# Patient Record
Sex: MALE | Race: WHITE | NOT HISPANIC OR LATINO | Employment: FULL TIME | URBAN - NONMETROPOLITAN AREA
[De-identification: names, ages, dates, MRNs, and addresses within clinical notes are randomized per-mention and may not be internally consistent; named-entity substitution may affect disease eponyms.]

---

## 2021-02-21 ENCOUNTER — HOSPITAL ENCOUNTER (EMERGENCY)
Facility: HOSPITAL | Age: 26
Discharge: HOME | End: 2021-02-21
Attending: EMERGENCY MEDICINE
Payer: COMMERCIAL

## 2021-02-21 VITALS
WEIGHT: 154.5 LBS | OXYGEN SATURATION: 99 % | HEIGHT: 70 IN | RESPIRATION RATE: 18 BRPM | SYSTOLIC BLOOD PRESSURE: 120 MMHG | BODY MASS INDEX: 22.12 KG/M2 | TEMPERATURE: 98 F | HEART RATE: 61 BPM | DIASTOLIC BLOOD PRESSURE: 74 MMHG

## 2021-02-21 DIAGNOSIS — R42 DIZZINESS: ICD-10-CM

## 2021-02-21 DIAGNOSIS — R42 VERTIGO: Primary | ICD-10-CM

## 2021-02-21 LAB
ALBUMIN SERPL BCP-MCNC: 4.7 G/DL (ref 3.5–5.2)
ALP SERPL-CCNC: 83 U/L (ref 55–135)
ALT SERPL W/O P-5'-P-CCNC: 70 U/L (ref 10–44)
ANION GAP SERPL CALC-SCNC: 2 MMOL/L (ref 8–16)
AST SERPL-CCNC: 31 U/L (ref 10–40)
BASOPHILS # BLD AUTO: 0.05 K/UL (ref 0–0.2)
BASOPHILS NFR BLD: 0.8 % (ref 0–1.9)
BILIRUB SERPL-MCNC: 1 MG/DL (ref 0.1–1)
BUN SERPL-MCNC: 10 MG/DL (ref 6–20)
CALCIUM SERPL-MCNC: 9.8 MG/DL (ref 8.7–10.5)
CHLORIDE SERPL-SCNC: 108 MMOL/L (ref 95–110)
CO2 SERPL-SCNC: 31 MMOL/L (ref 23–29)
CREAT SERPL-MCNC: 1 MG/DL (ref 0.5–1.4)
DIFFERENTIAL METHOD: NORMAL
EOSINOPHIL # BLD AUTO: 0.1 K/UL (ref 0–0.5)
EOSINOPHIL NFR BLD: 1.4 % (ref 0–8)
ERYTHROCYTE [DISTWIDTH] IN BLOOD BY AUTOMATED COUNT: 12.3 % (ref 11.5–14.5)
EST. GFR  (AFRICAN AMERICAN): >60 ML/MIN/1.73 M^2
EST. GFR  (NON AFRICAN AMERICAN): >60 ML/MIN/1.73 M^2
GLUCOSE SERPL-MCNC: 97 MG/DL (ref 70–110)
HCT VFR BLD AUTO: 45.6 % (ref 40–54)
HGB BLD-MCNC: 15.8 G/DL (ref 14–18)
IMM GRANULOCYTES # BLD AUTO: 0.02 K/UL (ref 0–0.04)
IMM GRANULOCYTES NFR BLD AUTO: 0.3 % (ref 0–0.5)
LYMPHOCYTES # BLD AUTO: 1.7 K/UL (ref 1–4.8)
LYMPHOCYTES NFR BLD: 27.1 % (ref 18–48)
MCH RBC QN AUTO: 30 PG (ref 27–31)
MCHC RBC AUTO-ENTMCNC: 34.6 G/DL (ref 32–36)
MCV RBC AUTO: 87 FL (ref 82–98)
MONOCYTES # BLD AUTO: 0.5 K/UL (ref 0.3–1)
MONOCYTES NFR BLD: 8.3 % (ref 4–15)
NEUTROPHILS # BLD AUTO: 4 K/UL (ref 1.8–7.7)
NEUTROPHILS NFR BLD: 62.1 % (ref 38–73)
NRBC BLD-RTO: 0 /100 WBC
PLATELET # BLD AUTO: 301 K/UL (ref 150–350)
PMV BLD AUTO: 10.2 FL (ref 9.2–12.9)
POTASSIUM SERPL-SCNC: 4.3 MMOL/L (ref 3.5–5.1)
PROT SERPL-MCNC: 8.4 G/DL (ref 6–8.4)
RBC # BLD AUTO: 5.27 M/UL (ref 4.6–6.2)
SODIUM SERPL-SCNC: 141 MMOL/L (ref 136–145)
WBC # BLD AUTO: 6.38 K/UL (ref 3.9–12.7)

## 2021-02-21 PROCEDURE — 36415 COLL VENOUS BLD VENIPUNCTURE: CPT

## 2021-02-21 PROCEDURE — 93005 ELECTROCARDIOGRAM TRACING: CPT

## 2021-02-21 PROCEDURE — 93010 EKG 12-LEAD: ICD-10-PCS | Mod: ,,, | Performed by: INTERNAL MEDICINE

## 2021-02-21 PROCEDURE — 93010 ELECTROCARDIOGRAM REPORT: CPT | Mod: ,,, | Performed by: INTERNAL MEDICINE

## 2021-02-21 PROCEDURE — 80053 COMPREHEN METABOLIC PANEL: CPT

## 2021-02-21 PROCEDURE — 99285 EMERGENCY DEPT VISIT HI MDM: CPT | Mod: 25

## 2021-02-21 PROCEDURE — 85025 COMPLETE CBC W/AUTO DIFF WBC: CPT

## 2021-02-21 PROCEDURE — 63600175 PHARM REV CODE 636 W HCPCS: Performed by: NURSE PRACTITIONER

## 2021-02-21 PROCEDURE — 25000003 PHARM REV CODE 250: Performed by: NURSE PRACTITIONER

## 2021-02-21 RX ORDER — ONDANSETRON 4 MG/1
4 TABLET, ORALLY DISINTEGRATING ORAL
Status: COMPLETED | OUTPATIENT
Start: 2021-02-21 | End: 2021-02-21

## 2021-02-21 RX ORDER — PREDNISONE 20 MG/1
40 TABLET ORAL
Status: COMPLETED | OUTPATIENT
Start: 2021-02-21 | End: 2021-02-21

## 2021-02-21 RX ORDER — MECLIZINE HYDROCHLORIDE 25 MG/1
50 TABLET ORAL
Status: COMPLETED | OUTPATIENT
Start: 2021-02-21 | End: 2021-02-21

## 2021-02-21 RX ORDER — MECLIZINE HYDROCHLORIDE 25 MG/1
25 TABLET ORAL 3 TIMES DAILY PRN
Qty: 9 TABLET | Refills: 0 | Status: SHIPPED | OUTPATIENT
Start: 2021-02-21 | End: 2021-02-24

## 2021-02-21 RX ORDER — MECLIZINE HYDROCHLORIDE 25 MG/1
25 TABLET ORAL 3 TIMES DAILY PRN
Qty: 9 TABLET | Refills: 0 | Status: SHIPPED | OUTPATIENT
Start: 2021-02-21 | End: 2021-02-21 | Stop reason: SDUPTHER

## 2021-02-21 RX ADMIN — PREDNISONE 40 MG: 20 TABLET ORAL at 01:02

## 2021-02-21 RX ADMIN — ONDANSETRON 4 MG: 4 TABLET, ORALLY DISINTEGRATING ORAL at 11:02

## 2021-02-21 RX ADMIN — MECLIZINE HYDROCHLORIDE 50 MG: 25 TABLET ORAL at 11:02

## 2023-03-12 ENCOUNTER — OFFICE VISIT (OUTPATIENT)
Dept: URGENT CARE | Facility: CLINIC | Age: 28
End: 2023-03-12
Payer: COMMERCIAL

## 2023-03-12 VITALS
HEIGHT: 71 IN | BODY MASS INDEX: 20.3 KG/M2 | TEMPERATURE: 100 F | WEIGHT: 145 LBS | SYSTOLIC BLOOD PRESSURE: 111 MMHG | DIASTOLIC BLOOD PRESSURE: 68 MMHG | RESPIRATION RATE: 16 BRPM | OXYGEN SATURATION: 97 % | HEART RATE: 109 BPM

## 2023-03-12 DIAGNOSIS — U07.1 COVID-19: Primary | ICD-10-CM

## 2023-03-12 DIAGNOSIS — R50.9 FEVER AND CHILLS: ICD-10-CM

## 2023-03-12 DIAGNOSIS — Z11.59 SCREENING FOR VIRAL DISEASE: ICD-10-CM

## 2023-03-12 LAB
CTP QC/QA: YES
SARS-COV-2 AG RESP QL IA.RAPID: POSITIVE

## 2023-03-12 PROCEDURE — 99204 OFFICE O/P NEW MOD 45 MIN: CPT | Mod: S$GLB,,,

## 2023-03-12 PROCEDURE — 99204 PR OFFICE/OUTPT VISIT, NEW, LEVL IV, 45-59 MIN: ICD-10-PCS | Mod: S$GLB,,,

## 2023-03-12 PROCEDURE — 87811 SARS-COV-2 COVID19 W/OPTIC: CPT | Mod: QW,S$GLB,,

## 2023-03-12 PROCEDURE — 87811 SARS CORONAVIRUS 2 ANTIGEN POCT, MANUAL READ: ICD-10-PCS | Mod: QW,S$GLB,,

## 2023-03-12 RX ORDER — CETIRIZINE HYDROCHLORIDE, PSEUDOEPHEDRINE HYDROCHLORIDE 5; 120 MG/1; MG/1
1 TABLET, FILM COATED, EXTENDED RELEASE ORAL 2 TIMES DAILY
Qty: 20 TABLET | Refills: 0 | Status: SHIPPED | OUTPATIENT
Start: 2023-03-12 | End: 2023-03-22

## 2023-03-12 NOTE — PROGRESS NOTES
"Subjective:       Patient ID: Mitul Pascual is a 27 y.o. male.    Vitals:  height is 5' 11" (1.803 m) and weight is 65.8 kg (145 lb). His tympanic temperature is 99.7 °F (37.6 °C). His blood pressure is 111/68 and his pulse is 109. His respiration is 16 and oxygen saturation is 97%.     Chief Complaint: Fever    States he started with fever last night. He is having chills and sweats, runny nose and slight cough and sneezing. Had a faint positive with at home covid test.     Fever   This is a new problem. The current episode started yesterday. The problem occurs constantly. The problem has been gradually worsening. The maximum temperature noted was 102 to 102.9 F. The temperature was taken using an axillary reading. Associated symptoms include coughing, headaches and a sore throat. Pertinent negatives include no diarrhea, nausea, vomiting or wheezing. He has tried NSAIDs (ibuprofen) for the symptoms. The treatment provided mild relief.     Constitution: Positive for chills and fever.   HENT:  Positive for postnasal drip, sinus pressure and sore throat. Negative for trouble swallowing and voice change.    Neck: Negative for painful lymph nodes.   Respiratory:  Positive for cough. Negative for sputum production, shortness of breath, stridor and wheezing.    Gastrointestinal:  Negative for nausea, vomiting and diarrhea.   Neurological:  Positive for headaches.   Hematologic/Lymphatic: Negative for swollen lymph nodes.     Objective:      Physical Exam   Constitutional: He is oriented to person, place, and time. He appears well-developed. He is cooperative.  Non-toxic appearance. He does not appear ill. No distress.   HENT:   Head: Normocephalic and atraumatic.   Ears:   Right Ear: Hearing, tympanic membrane, external ear and ear canal normal. No tenderness. Tympanic membrane is not erythematous and not bulging. Tympanic membrane mobility is normal. No middle ear effusion.   Left Ear: Hearing, tympanic membrane, external " ear and ear canal normal. No tenderness. Tympanic membrane is not erythematous and not bulging. Tympanic membrane mobility is normal.  No middle ear effusion.   Nose: Rhinorrhea and congestion present. No mucosal edema or nasal deformity. No epistaxis. Right sinus exhibits no maxillary sinus tenderness and no frontal sinus tenderness. Left sinus exhibits no maxillary sinus tenderness and no frontal sinus tenderness.   Mouth/Throat: Uvula is midline and mucous membranes are normal. No trismus in the jaw. Normal dentition. No uvula swelling. Posterior oropharyngeal erythema and cobblestoning present. No oropharyngeal exudate, posterior oropharyngeal edema or tonsillar abscesses. No tonsillar exudate.   Eyes: Conjunctivae and lids are normal. No scleral icterus.   Neck: Trachea normal and phonation normal. Neck supple. No edema present. No erythema present. No neck rigidity present.   Cardiovascular: Normal rate, regular rhythm, S1 normal, S2 normal, normal heart sounds and normal pulses.   Pulmonary/Chest: Effort normal and breath sounds normal. No stridor. No respiratory distress. He has no decreased breath sounds. He has no wheezes. He has no rhonchi. He has no rales. He exhibits no tenderness.   Abdominal: Normal appearance.   Musculoskeletal: Normal range of motion.         General: No deformity. Normal range of motion.   Lymphadenopathy:        Head (right side): No submental, no submandibular, no tonsillar, no preauricular, no posterior auricular and no occipital adenopathy present.        Head (left side): No submental, no submandibular, no tonsillar, no preauricular, no posterior auricular and no occipital adenopathy present.     He has no cervical adenopathy.   Neurological: He is alert and oriented to person, place, and time. He exhibits normal muscle tone. Coordination normal.   Skin: Skin is warm, dry, intact, not diaphoretic, not pale and no rash.   Psychiatric: His speech is normal and behavior is  normal. Judgment and thought content normal.   Nursing note and vitals reviewed.      Assessment:       1. COVID-19    2. Screening for viral disease    3. Fever and chills          Plan:         COVID-19  -     cetirizine-pseudoephedrine 5-120 mg Tb12; Take 1 tablet by mouth 2 (two) times a day. for 10 days  Dispense: 20 tablet; Refill: 0    Screening for viral disease  -     SARS Coronavirus 2 Antigen, POCT Manual Read    Fever and chills      Results for orders placed or performed in visit on 03/12/23   SARS Coronavirus 2 Antigen, POCT Manual Read   Result Value Ref Range    SARS Coronavirus 2 Antigen Positive (A) Negative     Acceptable Yes         - Pt instructed that they can take Tylenol, or acetaminophen for their pain. They can take up to 3,000mg per day, or 6 tablets, with a max of two tablets at a time,  as long as they do not have any liver disease.  -Pt instructed may take Motrin (ibuprofen) as needed every 8 hours for inflammation.      Recommend Vitamin C (1000mg daily), Vitamin d3 (125mcg daily), and Zinc (100mg daily) to help combat the Coronavirus.     Robitussin DM 2 teas every 4 hours as needed for cough.  If you  smoke, please stop smoking.        Discussed POSITIVE test results and plan of care with patient.  They voiced full understanding and are in agreement with the current plan of care.  All known questions and concerns addressed at this time.      You must understand that you have received treatment at an Urgent Care Facility only, and that you may be released before all of your medical problems are known or treated.  Urgent Care facilities are not equipped to handle life threatening emergencies.  It is recommended that you seek care at an Emergency Department for further evaluation of worsening or concerning symptoms, or possibly life threatening conditions as discussed.

## 2023-03-12 NOTE — PATIENT INSTRUCTIONS
Please drink plenty of fluids.  Please get plenty of rest.  Please return here or go to the Emergency Department for any concerns or worsening of condition.     You have tested positive for COVID-19 today.  Please note that patients who test positive for COVID-19 are required by the CDC to undergo isolation for 5 days after their symptoms first began, followed by a 5 day period of strict mask wearing.  This isolation starts from the day you first developed symptoms, not the day of your positive test. For example, if your symptoms began on a Monday but tested positive on the following Wednesday, your 5-day isolation begins from that Monday, not the Wednesday you tested positive.  However, if you are asymptomatic (a person who does not have any symptoms) and COVID-19 positive, your 5-day isolation begins on the day you tested positive, regardless of exposure date.  Also, per the CDC guidelines, once your 5 days have passed, and you have not had fever greater than 100.4F in the last 24 hours without taking any fever reducers such as Tylenol (Acetaminophen) or Motrin (Ibuprofen), you may return to your normal activities including social distancing, wearing masks, and frequent handwashing - YOU DO NOT NEED ANOTHER TEST IN ORDER TO END YOUR QUARANTINE.     If you were prescribed a narcotic medication, do not drive or operate heavy equipment or machinery while taking these medications.     If you do not have Hypertension or any history of palpitations, it is ok to take over the counter Sudafed or Mucinex D or Allegra-D or Claritin-D or Zyrtec-D.  If you do take one of the above, it is ok to combine that with plain over the counter Mucinex or Allegra or Claritin or Zyrtec.  If for example you are taking Zyrtec -D, you can combine that with Mucinex, but not Mucinex-D.  If you are taking Mucinex-D, you can combine that with plain Allegra or Claritin or Zyrtec.   If you do have Hypertension or palpitations, it is safe to take  Coricidin HBP for relief of sinus symptoms.     We recommend you take over the counter Flonase (Fluticasone) or another nasally inhaled steroid unless you are already taking one.  Nasal irrigation with a saline spray or Netti Pot like device per their directions is also recommended.  If not allergic, please take over the counter Tylenol (Acetaminophen) and/or Motrin (Ibuprofen) as directed for control of pain and/or fever.     We recommend Vitamin C (1000mg daily), Vitamin d3 (125mcg daily), and Zinc (100mg daily) to help combat the Coronavirus.     Robitussin DM 2 teas every 4 hours as needed for cough.  If you  smoke, please stop smoking.     YOU DO NOT NEED ANOTHER TEST IN ORDER TO END YOUR QUARANTINE.    [facility] COVID QUARANTINE     Quarantine    The 5 day quarantine begins from the day you were exposed, not the day of your test.  For example, if your exposure was on a Monday, and you waited until Friday of the same week to get tested and it was negative, your 5 day quarantine begins from that Monday, not the Friday you tested negative.  Please note, if you decide to test as an asymptomatic during your quarantine and you are positive, you will be restarting your quarantine.     You must understand that you have received treatment at an Urgent Care facility only, and that you may be  released before all of your medical problems are known or treated. Urgent Care facilities are not equipped to  handle life threatening emergencies. It is recommended that you seek care at an Emergency Department for  further evaluation of worsening or concerning symptoms, or possibly life threatening conditions as  Discussed.

## 2023-03-12 NOTE — LETTER
March 12, 2023      [facility]  [address], [address]  [place] [address]  Phone: [phone]  Fax: [phone]       Patient: Mitul Pascual   YOB: 1995  Date of Visit: 03/12/2023    To Whom It May Concern:    Camila Pascual  was at [facility] on 03/12/2023. The patient may return to work/school on 3/18/23 with no restrictions. If you have any questions or concerns, or if I can be of further assistance, please do not hesitate to contact me.    Sincerely,    Angelia Chadwick, NP

## 2025-06-17 ENCOUNTER — OFFICE VISIT (OUTPATIENT)
Dept: URGENT CARE | Facility: CLINIC | Age: 30
End: 2025-06-17
Payer: MEDICAID

## 2025-06-17 ENCOUNTER — TELEPHONE (OUTPATIENT)
Dept: URGENT CARE | Facility: CLINIC | Age: 30
End: 2025-06-17

## 2025-06-17 VITALS
RESPIRATION RATE: 16 BRPM | HEIGHT: 71 IN | SYSTOLIC BLOOD PRESSURE: 115 MMHG | OXYGEN SATURATION: 97 % | BODY MASS INDEX: 20.58 KG/M2 | WEIGHT: 147 LBS | DIASTOLIC BLOOD PRESSURE: 78 MMHG | HEART RATE: 68 BPM | TEMPERATURE: 98 F

## 2025-06-17 DIAGNOSIS — J06.9 VIRAL URI WITH COUGH: Primary | ICD-10-CM

## 2025-06-17 DIAGNOSIS — R06.2 WHEEZING: ICD-10-CM

## 2025-06-17 PROCEDURE — 99214 OFFICE O/P EST MOD 30 MIN: CPT | Mod: S$GLB,,,

## 2025-06-17 RX ORDER — ALBUTEROL SULFATE 90 UG/1
2 INHALANT RESPIRATORY (INHALATION) EVERY 6 HOURS PRN
Qty: 18 G | Refills: 0 | Status: SHIPPED | OUTPATIENT
Start: 2025-06-17 | End: 2026-06-17

## 2025-06-17 RX ORDER — CARIPRAZINE 1.5 MG/1
1.5 CAPSULE, GELATIN COATED ORAL
COMMUNITY
Start: 2025-05-21

## 2025-06-17 RX ORDER — VITAMIN A 3000 MCG
1 CAPSULE ORAL
Qty: 50 ML | Refills: 0 | Status: SHIPPED | OUTPATIENT
Start: 2025-06-17

## 2025-06-17 RX ORDER — MIRTAZAPINE 7.5 MG/1
7.5 TABLET, FILM COATED ORAL NIGHTLY
COMMUNITY
Start: 2025-05-07

## 2025-06-17 RX ORDER — FLUTICASONE PROPIONATE 50 MCG
1 SPRAY, SUSPENSION (ML) NASAL DAILY
Qty: 9.9 ML | Refills: 0 | Status: SHIPPED | OUTPATIENT
Start: 2025-06-17

## 2025-06-17 RX ORDER — BROMPHENIRAMINE MALEATE, PSEUDOEPHEDRINE HYDROCHLORIDE, AND DEXTROMETHORPHAN HYDROBROMIDE 2; 30; 10 MG/5ML; MG/5ML; MG/5ML
5 SYRUP ORAL EVERY 4 HOURS PRN
Qty: 118 ML | Refills: 0 | Status: SHIPPED | OUTPATIENT
Start: 2025-06-17 | End: 2025-06-27

## 2025-06-17 NOTE — TELEPHONE ENCOUNTER
Pharmacy called with questions regarding medication prescribed.  Relayed phone call to Yolis Carrillo NP.

## 2025-06-17 NOTE — PROGRESS NOTES
"Subjective:      Patient ID: Mitul Pascual is a 29 y.o. male.    Vitals:  height is 5' 11" (1.803 m) and weight is 66.7 kg (147 lb). His oral temperature is 98.4 °F (36.9 °C). His blood pressure is 115/78 and his pulse is 68. His respiration is 16 and oxygen saturation is 97%.     Chief Complaint: Sinus Problem    28 yo M here with 3 day hx of nasal congestion, cough, ear pressure, sneezing, sore throat and shortness of breath. He feel slike he has wheezes sometimes.  Smokes 0.5 ppd.      Sinus Problem  This is a new problem. Episode onset: 3 days. The problem has been gradually worsening since onset. There has been no fever. His pain is at a severity of 5/10. The pain is mild. Associated symptoms include congestion, coughing, ear pain, headaches, shortness of breath, sinus pressure, sneezing and a sore throat. Treatments tried: zyrtec, mucinex. The treatment provided no relief.       Constitution: Positive for fatigue. Negative for fever.   HENT:  Positive for ear pain, congestion, sinus pressure and sore throat.    Respiratory:  Positive for cough, shortness of breath and wheezing.    Gastrointestinal:  Negative for nausea, vomiting and diarrhea.   Allergic/Immunologic: Positive for sneezing.   Neurological:  Positive for headaches.      Objective:     Physical Exam   Constitutional: He is oriented to person, place, and time.  Non-toxic appearance. He does not appear ill. No distress. normal  HENT:   Head: Normocephalic and atraumatic.   Ears:   Right Ear: Tympanic membrane, external ear and ear canal normal.   Left Ear: Tympanic membrane, external ear and ear canal normal.   Nose: Rhinorrhea and congestion present.   Mouth/Throat: Posterior oropharyngeal erythema present. No oropharyngeal exudate.   Eyes: Conjunctivae are normal. Pupils are equal, round, and reactive to light.   Cardiovascular: Normal rate, regular rhythm and normal heart sounds.   Pulmonary/Chest: Effort normal and breath sounds normal. He has no " wheezes.   Abdominal: Normal appearance. Soft.   Neurological: He is alert and oriented to person, place, and time.   Skin: Skin is warm, dry and not diaphoretic. Capillary refill takes less than 2 seconds.   Psychiatric: His behavior is normal.   Nursing note and vitals reviewed.      Assessment:     1. Viral URI with cough    2. Wheezing        Plan:       Viral URI with cough  -     brompheniramine-pseudoeph-DM (BROMFED DM) 2-30-10 mg/5 mL Syrp; Take 5 mLs by mouth every 4 (four) hours as needed (upper resp sx).  Dispense: 118 mL; Refill: 0  -     fluticasone propionate (FLONASE) 50 mcg/actuation nasal spray; 1 spray (50 mcg total) by Each Nostril route once daily.  Dispense: 9.9 mL; Refill: 0  -     sodium chloride (SALINE NASAL) 0.65 % nasal spray; 1 spray by Nasal route as needed for Congestion.  Dispense: 50 mL; Refill: 0    Wheezing  -     albuterol (PROVENTIL HFA) 90 mcg/actuation inhaler; Inhale 2 puffs into the lungs every 6 (six) hours as needed for Wheezing. Rescue  Dispense: 18 g; Refill: 0      Patient Instructions   UPPER RESPIRATORY INFECTIONS    An upper respiratory infection can affect your nose, throat, ears, and sinuses.  They are usually caused by viruses.    URIs are contagious and may be spread to others through coughing, sneezing, or close contact. The virus can also stay on objects and surfaces. You can become infected if you touch the object or surface and then touch your eyes, mouth, or nose.    Viruses do not get better with antibiotics. Most people get better in 7 to 14 days. You may continue to cough for 2 to 3 weeks.      Criteria for antibiotics include:  Upper respiratory infections lasting longer than 10-14 days without improvement.  New or worsening symptoms after 5 to 7 days  Worsening symptoms after initial improvement.   Co-existing infection such as Acute Otitis Media (ear infection).     The use of antibiotics for nonbacterial upper respiratory infections has resulted in a  severe problem with the emergence of bacteria which are resistant to multiple forms of antibiotics, and some bacteria are currently only treatable with intravenous antibiotics.    The following are suggestions to help you with upper respiratory symptoms.    Body Aches/Pains/Fever  For patients who are not allergic to and are not on anticoagulants, you can alternate Tylenol every 4 hours and Motrin every 6 hours for fever above 100.4F and/or pain.  For patients who are allergic or intolerant to NSAIDS, have gastritis, gastric ulcers, or history of GI bleeds, are pregnant, or are on anticoagulant therapy, you can take Tylenol every 4 hours as needed for fever above 100.4F and/or pain.    CONGESTION:    Nasal Saline   Nasal saline is available over the counter. There are several different commercial preparations such as Ocean spray and Ayr spray. There is no limit on the use of Nasal saline. Saline is used by snorting the mist up into the nose then later gently blowing the nose to get rid of any secretions that it has loosened.    Nasal irrigation   Flushing the nose and sinuses with a saline solution several times per day can decrease pain associated with congestion and shorten the duration of symptoms.     Decongestant Nasal Sprays  Over-the-counter decongestant nasal spray such as Afrin, may be helpful as an initial step in treating upper respiratory infections. This spray can be used for up to approximately 3 days and is used no more than twice per day. Topical nasal decongestant spray for longer than 5 days will result in a physical addiction, in which the nasal lining will become significantly swollen and irritated until the spray is used again. They May also cause elevated blood pressure.    Nasal Steroids  Nasal steroids, such as Flonase, can be beneficial and help reduce swelling inside the nose. These drugs have few side effects and relieve symptoms in most people. Unfortunately, they do not begin to work  for 2-3 days and do not reach their maximum benefit for approximately 2-3 weeks.   There are several nasal steroids available by prescription as well as a few that can be purchased without a prescription (over the counter). These drugs are all effective but differ in how frequently they must be used and how much they cost.    Nasal anticholinergics  Ipratropium bromide (nasal spray) is available by prescription and can be very effective in decreasing the symptom of runny nose and other related symptoms (eg, post-nasal drainage, sore throat). These sprays, like all medications, can interact with other medications, so it is important that your complete medication list be reviewed by your physician before you take this medication.    If you develop a bloody nose, stop using the medication immediately.    Oral Decongestant  Use pseudoephedrine (behind the counter) for sinus pressure and congestion- Pseudoephedrine 30 mg up to 240 mg /day. Common brands include Sudafed, Zephrex-D Wal-phed.  Warning: It can raise your blood pressure and give you palpitations, avoid with history of high blood pressure, palpitations, and severe cardiac disease.  Coricidin HBP is okay to use if you have high blood pressure.     Mucous Thinners/Decongestant Combination   Mucous thinners and decongestants are used to shrink down the tissues and promote sinus drainage. There are multiple prescription and over-the-counter medications available. A mucous thinner will tend to be drying unless you are also drinking plenty of water when taking these. If you have high blood pressure, it is very important to monitor your pressure while on decongestants. The mucous thinner/decongestant combinations are typically given twice per day. However, some people will be unable to tolerate these at night and should only take them once per day.    CLEAR RUNNY NOSE AND ALLERGIC RHINITIS:  Use an antihistamine to help dry you out or nasal anticholinergics as  mentioned above.     Antihistamines  Antihistamines are available both over the counter and as a prescription. There are also various decongestant and antihistamine combinations available such as Claritin, Allegra, and Zyrtec. It is best to take any antihistamine-decongestant combination in the morning to avoid insomnia. Zyrtec should probably be taken at night, to reduce the chance of sleepiness during the daytime. If there is a significant infection present and secretions are already thickened, it is recommended to discontinue antihistamines and use a mucous thinner/decongestant combination.    Oral Steroids  Oral steroids can be used with more severe infections. Often, they are the only medications that will reduce the symptoms of pressure and allow the nasal sinuses to drain. These are best taken on a full stomach and earlier in the day is better. They may give you some irritability, stomach upset, or hyperactivity. This can also interfere with sleep.     A person who has high blood pressure or diabetes should be very careful and monitor their blood pressure or blood glucose while taking steroids. Steroids can have multiple side effects especially when taken long-term. Short-term doses are usually very well tolerated and effective in controlling the symptoms associated with sinus infections and severe allergies. The use of steroids for greater than approximately seven days requires a tapering down to discontinue them. You should not abruptly stop your steroid if you have been taking the same dose for greater than 7 days.     Maintain adequate hydration - Rest and keep yourself/patient well hydrated. For adults, it is recommended to drink at least 8 glasses of water daily.  This may help thin secretions and soothe the respiratory mucosa.     SORE THROAT:  Perform warm, saltwater gargles (1/2 tsp salt to 1 cup warm water) to help reduce inflammation and throat discomfort. Chloraseptic sprays and throat lozenges  will also help with your throat pain.    COUGH:  A viral cough may linger for 3 to 4 weeks but should steadily improve over time. Coughing is the body's natural way to clear mucus and help get rid of bacteria and viruses. Therefore, cough suppressants are usually not recommended.      Use Mucinex (guaifenesin) up to 2400mg/day to help clear and break up/loosen mucus/congestion from the chest when you have a cold or flu.      Common cough suppressants include the ingredient dextromethorphan or DM, (such as Mucinex DM) available over the counter and can be used for cough to stop the tickle in the back of your throat.      ? Honey may be beneficial, especially on nocturnal cough 1 to 2 teaspoons can be taken straight or diluted in tea, juice or other liquid.    The antioxidants in honey are an important contributor to its decongestant properties. Darker honey contains more antioxidants. Buckwheat and avocado honey are particularly good choices. If these honeys are not available in your area, choose the darkest honey you can find.    It is important to follow up for Re-evaluation if new or worsening symptoms develop or symptoms exceed the expected duration of common cold.     Worsening or persistent symptoms may indicate the development of complications or the need to consider a diagnosis other than the common cold requiring an antibiotic. (eg, acute bacterial sinusitis, pneumonia, pertussis).    Go to Emergency Department or call 911 if you develop new or worsening symptoms including but not limited to:  Trouble breathing.  New or worsening chest discomfort.  Feel confused or disoriented.  Vomiting and can't keep liquids down.  Develop signs of fluid loss, such as dark-colored urine and muscle cramps.    **You must understand that you have received Urgent Care treatment only and that you may be released before all your medical problems are known or treated. You, the patient, are responsible to arrange for follow-up  care as instructed.

## 2025-06-17 NOTE — LETTER
June 17, 2025      [facility]  [address], [address]  [place] [address]  Phone: [phone]  Fax: [phone]       Patient: Mitul Pascual   YOB: 1995  Date of Visit: 06/17/2025    To Whom It May Concern:    Camila Pascual  was at [facility] on 06/17/2025. The patient may return to work/school on 6/19/25 with no restrictions. If you have any questions or concerns, or if I can be of further assistance, please do not hesitate to contact me.    Sincerely,      Yolis Carrillo NP

## 2025-06-17 NOTE — PATIENT INSTRUCTIONS
UPPER RESPIRATORY INFECTIONS    An upper respiratory infection can affect your nose, throat, ears, and sinuses.  They are usually caused by viruses.    URIs are contagious and may be spread to others through coughing, sneezing, or close contact. The virus can also stay on objects and surfaces. You can become infected if you touch the object or surface and then touch your eyes, mouth, or nose.    Viruses do not get better with antibiotics. Most people get better in 7 to 14 days. You may continue to cough for 2 to 3 weeks.      Criteria for antibiotics include:  Upper respiratory infections lasting longer than 10-14 days without improvement.  New or worsening symptoms after 5 to 7 days  Worsening symptoms after initial improvement.   Co-existing infection such as Acute Otitis Media (ear infection).     The use of antibiotics for nonbacterial upper respiratory infections has resulted in a severe problem with the emergence of bacteria which are resistant to multiple forms of antibiotics, and some bacteria are currently only treatable with intravenous antibiotics.    The following are suggestions to help you with upper respiratory symptoms.    Body Aches/Pains/Fever  For patients who are not allergic to and are not on anticoagulants, you can alternate Tylenol every 4 hours and Motrin every 6 hours for fever above 100.4F and/or pain.  For patients who are allergic or intolerant to NSAIDS, have gastritis, gastric ulcers, or history of GI bleeds, are pregnant, or are on anticoagulant therapy, you can take Tylenol every 4 hours as needed for fever above 100.4F and/or pain.    CONGESTION:    Nasal Saline   Nasal saline is available over the counter. There are several different commercial preparations such as Ocean spray and A[place] spray. There is no limit on the use of Nasal saline. Saline is used by snorting the mist up into the nose then later gently blowing the nose to get rid of any secretions that it has  loosened.    Nasal irrigation   Flushing the nose and sinuses with a saline solution several times per day can decrease pain associated with congestion and shorten the duration of symptoms.     Decongestant Nasal Sprays  Over-the-counter decongestant nasal spray such as Afrin, may be helpful as an initial step in treating upper respiratory infections. This spray can be used for up to approximately 3 days and is used no more than twice per day. Topical nasal decongestant spray for longer than 5 days will result in a physical addiction, in which the nasal lining will become significantly swollen and irritated until the spray is used again. They May also cause elevated blood pressure.    Nasal Steroids  Nasal steroids, such as Flonase, can be beneficial and help reduce swelling inside the nose. These drugs have few side effects and relieve symptoms in most people. Unfortunately, they do not begin to work for 2-3 days and do not reach their maximum benefit for approximately 2-3 weeks.   There are several nasal steroids available by prescription as well as a few that can be purchased without a prescription (over the counter). These drugs are all effective but differ in how frequently they must be used and how much they cost.    Nasal anticholinergics  Ipratropium bromide (nasal spray) is available by prescription and can be very effective in decreasing the symptom of runny nose and other related symptoms (eg, post-nasal drainage, sore throat). These sprays, like all medications, can interact with other medications, so it is important that your complete medication list be reviewed by your physician before you take this medication.    If you develop a bloody nose, stop using the medication immediately.    Oral Decongestant  Use pseudoephedrine (behind the counter) for sinus pressure and congestion- Pseudoephedrine 30 mg up to 240 mg /day. Common brands include Sudafed, Zephrex-D Wal-phed.  Warning: It can raise your blood  pressure and give you palpitations, avoid with history of high blood pressure, palpitations, and severe cardiac disease.  Coricidin HBP is okay to use if you have high blood pressure.     Mucous Thinners/Decongestant Combination   Mucous thinners and decongestants are used to shrink down the tissues and promote sinus drainage. There are multiple prescription and over-the-counter medications available. A mucous thinner will tend to be drying unless you are also drinking plenty of water when taking these. If you have high blood pressure, it is very important to monitor your pressure while on decongestants. The mucous thinner/decongestant combinations are typically given twice per day. However, some people will be unable to tolerate these at night and should only take them once per day.    CLEAR RUNNY NOSE AND ALLERGIC RHINITIS:  Use an antihistamine to help dry you out or nasal anticholinergics as mentioned above.     Antihistamines  Antihistamines are available both over the counter and as a prescription. There are also various decongestant and antihistamine combinations available such as Claritin, Allegra, and Zyrtec. It is best to take any antihistamine-decongestant combination in the morning to avoid insomnia. Zyrtec should probably be taken at night, to reduce the chance of sleepiness during the daytime. If there is a significant infection present and secretions are already thickened, it is recommended to discontinue antihistamines and use a mucous thinner/decongestant combination.    Oral Steroids  Oral steroids can be used with more severe infections. Often, they are the only medications that will reduce the symptoms of pressure and allow the nasal sinuses to drain. These are best taken on a full stomach and earlier in the day is better. They may give you some irritability, stomach upset, or hyperactivity. This can also interfere with sleep.     A person who has high blood pressure or diabetes should be very  careful and monitor their blood pressure or blood glucose while taking steroids. Steroids can have multiple side effects especially when taken long-term. Short-term doses are usually very well tolerated and effective in controlling the symptoms associated with sinus infections and severe allergies. The use of steroids for greater than approximately seven days requires a tapering down to discontinue them. You should not abruptly stop your steroid if you have been taking the same dose for greater than 7 days.     Maintain adequate hydration - Rest and keep yourself/patient well hydrated. For adults, it is recommended to drink at least 8 glasses of water daily.  This may help thin secretions and soothe the respiratory mucosa.     SORE THROAT:  Perform warm, saltwater gargles (1/2 tsp salt to 1 cup warm water) to help reduce inflammation and throat discomfort. Chloraseptic sprays and throat lozenges will also help with your throat pain.    COUGH:  A viral cough may linger for 3 to 4 weeks but should steadily improve over time. Coughing is the body's natural way to clear mucus and help get rid of bacteria and viruses. Therefore, cough suppressants are usually not recommended.      Use Mucinex (guaifenesin) up to 2400mg/day to help clear and break up/loosen mucus/congestion from the chest when you have a cold or flu.      Common cough suppressants include the ingredient dextromethorphan or DM, (such as Mucinex DM) available over the counter and can be used for cough to stop the tickle in the back of your throat.      ? Honey may be beneficial, especially on nocturnal cough 1 to 2 teaspoons can be taken straight or diluted in tea, juice or other liquid.    The antioxidants in honey are an important contributor to its decongestant properties. Darker honey contains more antioxidants. Buckwheat and avocado honey are particularly good choices. If these honeys are not available in your area, choose the darkest honey you can  find.    It is important to follow up for Re-evaluation if new or worsening symptoms develop or symptoms exceed the expected duration of common cold.     Worsening or persistent symptoms may indicate the development of complications or the need to consider a diagnosis other than the common cold requiring an antibiotic. (eg, acute bacterial sinusitis, pneumonia, pertussis).    Go to Emergency Department or call 911 if you develop new or worsening symptoms including but not limited to:  Trouble breathing.  New or worsening chest discomfort.  Feel confused or disoriented.  Vomiting and can't keep liquids down.  Develop signs of fluid loss, such as dark-colored urine and muscle cramps.    **You must understand that you have received Urgent Care treatment only and that you may be released before all your medical problems are known or treated. You, the patient, are responsible to arrange for follow-up care as instructed.       Airway patent, Nasal mucosa clear. Mouth with normal mucosa. Throat has no vesicles, no oropharyngeal exudates and uvula is midline.